# Patient Record
(demographics unavailable — no encounter records)

---

## 2024-12-13 NOTE — ASSESSMENT
[FreeTextEntry1] : PE: Const: Awake, alert, oriented  Eyes: EOMI. sclera without erythema or injection  Resp: Even , unlabored. no increased WOB. No cough appreciated  Neck: no JVD  Neuro: Nl Tone  Psych: Nl Affect  Skin: 8mm cyst noted centrally between breasts   Plan for excision in office of cyst (CPT 30103, 03269)  I, Dr. Sabillon, personally performed the evaluation and management (E/M) services for this new patient. That E/M includes conducting the clinically appropriate initial history &/or exam, assessing all conditions, and establishing the plan of care. Today, my EMELY, Pedro Osorio PA-C, was here to observe my evaluation and management service for this patient & follow plan of care established by me going forward.

## 2024-12-13 NOTE — HISTORY OF PRESENT ILLNESS
[FreeTextEntry1] : 27yo patient who presents for Problem: cyst on chest Location and duration: couple years Seen by Dermatology: [place] Recurrently drains and shrinks in size. Discomfort noted when cyst is full No imaging/Biopsy No infection No personal history of skin cancer

## 2024-12-13 NOTE — ASSESSMENT
[FreeTextEntry1] : PE: Const: Awake, alert, oriented  Eyes: EOMI. sclera without erythema or injection  Resp: Even , unlabored. no increased WOB. No cough appreciated  Neck: no JVD  Neuro: Nl Tone  Psych: Nl Affect  Skin: 8mm cyst noted centrally between breasts   Plan for excision in office of cyst (CPT 53706, 01450)  I, Dr. Sabillon, personally performed the evaluation and management (E/M) services for this new patient. That E/M includes conducting the clinically appropriate initial history &/or exam, assessing all conditions, and establishing the plan of care. Today, my EMELY, Pedro Osorio PA-C, was here to observe my evaluation and management service for this patient & follow plan of care established by me going forward.

## 2025-01-11 NOTE — PROCEDURE
[FreeTextEntry6] : Preopdx: chest mass Procedure: excisional biopsy 1.1cm chest mass and complex closure 1.1cm chest Anesthesia: local 1% w/epi No complications  Summary: IC obtained. Lesion demarcated with marking pen. 1%lido with epinephrine injected. 15 blade used to incise full thickness. 1.1 lesion granulation type tissue excised as an ellipse full thickness in subcutaneous plane. Hemostasis obtained with cautery. Skin edges widely undermined and closed for a complex closure of 1.1  bacitracin and steristrips placed.

## 2025-01-24 NOTE — HISTORY OF PRESENT ILLNESS
[FreeTextEntry1] : Patient returns two weeks following excisional biopsy 1.1cm chest mass and complex closure 1.1cm chest  Pathology results pending.

## 2025-01-24 NOTE — ASSESSMENT
[FreeTextEntry1] : Incision sites healing well. Incision site intact and well approximated. Sutures removed. No bleeding. No s/sx infection, cellulitis, purulent discharge. No odor. No erythema or edema. Follow up as needed.  I, Dr. Maldonado, personally performed the evaluation and management (E/M) services for this established patient who presents today for follow up. That E/M includes conducting the clinically appropriate interval history &/or exam, assessing all new/exacerbated conditions, and establishing a new plan of care. Today, my EMELY, Pedro Osorio PA-C, was here to observe my evaluation and management service for this continued condition and follow the plan of care established by me going forward.

## 2025-01-24 NOTE — REASON FOR VISIT
[Post Op: _________] : a [unfilled] post op visit [FreeTextEntry1] : Dos: 01/10/25; S/P excisional biopsy 1.1cm chest mass and complex closure 1.1cm chest

## 2025-03-02 NOTE — HISTORY OF PRESENT ILLNESS
[FreeTextEntry1] :  Dos: 01/10/25; S/P excisional biopsy 1.1cm chest mass and complex closure 1.1cm chest. Patient notes area of induration at scar site

## 2025-04-09 NOTE — HISTORY OF PRESENT ILLNESS
[FreeTextEntry1] :  Dos: 01/10/25; S/P excisional biopsy 1.1cm chest mass and complex closure 1.1cm chest. Patient notes area of induration at scar site.  Patient had ultrasound.  Which did not show a discrete cystic mass.  However she reports that at the time of the ultrasound the mass was flat and not really palpable. MPRESSION: There is a 1.8 cm avascular hypoechoic focus in the subcutaneous fat just deep to the skin surface to the left of midline in the mid chest. It is not cystic. It could represent scar tissue. Recommend correlation with physical exam. MRI of the chest with soft tissue mass protocol and intravenous contrast could be useful for further evaluation if clinically indicated.  Patient reports that about once a month the mass becomes inflamed and red and firm and then seems to flatten out again there is no significant drainage patient had received steroid injections in the past prior to.  The removal however it seems that the mass that she feels currently is not different area adjacent to where the incision was made         CECIL OLIVERA MD; Attending Radiologist This document has been electronically signed. Mar 12 2025 10:13AM